# Patient Record
Sex: MALE | URBAN - METROPOLITAN AREA
[De-identification: names, ages, dates, MRNs, and addresses within clinical notes are randomized per-mention and may not be internally consistent; named-entity substitution may affect disease eponyms.]

---

## 2019-05-30 ENCOUNTER — OFFICE VISIT (OUTPATIENT)
Dept: URBAN - METROPOLITAN AREA CLINIC 76 | Facility: CLINIC | Age: 64
End: 2019-05-30
Payer: COMMERCIAL

## 2019-05-30 DIAGNOSIS — H10.45 OTHER CHRONIC ALLERGIC CONJUNCTIVITIS: ICD-10-CM

## 2019-05-30 PROCEDURE — 92004 COMPRE OPH EXAM NEW PT 1/>: CPT | Performed by: OPTOMETRIST

## 2019-05-30 PROCEDURE — 92015 DETERMINE REFRACTIVE STATE: CPT | Performed by: OPTOMETRIST

## 2019-05-30 RX ORDER — OLOPATADINE HYDROCHLORIDE 7 MG/ML
0.7 % SOLUTION OPHTHALMIC
Qty: 1 | Refills: 8 | Status: INACTIVE
Start: 2019-05-30 | End: 2019-06-07

## 2019-05-30 ASSESSMENT — VISUAL ACUITY
OS: 20/30
OD: 20/40

## 2019-05-30 ASSESSMENT — KERATOMETRY
OD: 42.00
OS: 41.25

## 2019-05-30 ASSESSMENT — INTRAOCULAR PRESSURE
OD: 16
OS: 16

## 2019-05-30 NOTE — IMPRESSION/PLAN
Impression: Other chronic allergic conjunctivitis: H10.45. Plan: RX Pazeo Qam ou. Cool Compresses PRN.

## 2022-02-25 ENCOUNTER — OFFICE VISIT (OUTPATIENT)
Dept: URBAN - METROPOLITAN AREA CLINIC 76 | Facility: CLINIC | Age: 67
End: 2022-02-25
Payer: COMMERCIAL

## 2022-02-25 DIAGNOSIS — H52.13 MYOPIA, BILATERAL: ICD-10-CM

## 2022-02-25 DIAGNOSIS — H25.13 AGE-RELATED NUCLEAR CATARACT, BILATERAL: ICD-10-CM

## 2022-02-25 DIAGNOSIS — H35.30 UNSPECIFIED MACULAR DEGENERATION: Primary | ICD-10-CM

## 2022-02-25 PROCEDURE — 99213 OFFICE O/P EST LOW 20 MIN: CPT | Performed by: OPTOMETRIST

## 2022-02-25 ASSESSMENT — VISUAL ACUITY
OD: 20/25
OS: 20/30

## 2022-02-25 ASSESSMENT — INTRAOCULAR PRESSURE
OS: 16
OD: 15

## 2022-02-25 ASSESSMENT — KERATOMETRY
OD: 42.25
OS: 40.75

## 2022-02-25 NOTE — IMPRESSION/PLAN
Impression: Unspecified macular degeneration: H35.30. Left. Suspect of myopic degeneration Preformed and reviewed MAC OCT
OD:Myopic degeneration with no leakage OS: Myopic degeneration with no leakage  Plan: Discussed condition.  continue to monitor

## 2022-04-29 ENCOUNTER — OFFICE VISIT (OUTPATIENT)
Dept: URBAN - METROPOLITAN AREA CLINIC 76 | Facility: CLINIC | Age: 67
End: 2022-04-29
Payer: COMMERCIAL

## 2022-04-29 DIAGNOSIS — H10.45 OTHER CHRONIC ALLERGIC CONJUNCTIVITIS: ICD-10-CM

## 2022-04-29 DIAGNOSIS — H52.13 MYOPIA, BILATERAL: Primary | ICD-10-CM

## 2022-04-29 DIAGNOSIS — H25.13 AGE-RELATED NUCLEAR CATARACT, BILATERAL: ICD-10-CM

## 2022-04-29 DIAGNOSIS — H04.123 DRY EYE SYNDROME OF BILATERAL LACRIMAL GLANDS: ICD-10-CM

## 2022-04-29 PROCEDURE — 92012 INTRM OPH EXAM EST PATIENT: CPT | Performed by: OPTOMETRIST

## 2022-04-29 ASSESSMENT — KERATOMETRY
OS: 41.25
OD: 42.38

## 2022-04-29 ASSESSMENT — VISUAL ACUITY
OD: 20/30
OS: 20/30

## 2022-04-29 ASSESSMENT — INTRAOCULAR PRESSURE
OS: 15
OD: 16

## 2022-04-29 NOTE — IMPRESSION/PLAN
Impression: Other chronic allergic conjunctivitis: H10.45. Plan: Discussed diagnosis with patient. Pt to continue using his allergy drops.